# Patient Record
(demographics unavailable — no encounter records)

---

## 2024-10-08 NOTE — HISTORY OF PRESENT ILLNESS
[de-identified] : Patient with h/o left SNHL and dead right ear- wears left amplification, chronic rhinitis, dizziness, left epistaxis s/p electrocauterization 10/2/24, and dysphagia presents today with Daughter stating that he had a severe nosebleed the day after cauterization and this morning. Daughter states that his cariologist is going to keep him off of his blood thinners for the next week. Daughter states that he has been using saline gel spray.

## 2024-10-08 NOTE — CONSULT LETTER
[Dear  ___] : Dear  [unfilled], [Courtesy Letter:] : I had the pleasure of seeing your patient, [unfilled], in my office today. [Please see my note below.] : Please see my note below. [Consult Closing:] : Thank you very much for allowing me to participate in the care of this patient.  If you have any questions, please do not hesitate to contact me. [Sincerely,] : Sincerely, [FreeTextEntry3] :  Sivakumar Russell MD FACS

## 2024-10-08 NOTE — ADDENDUM
[FreeTextEntry1] :  Documented by Randy Leong acting as scribe for Dr. Russell on 10/08/2024. All Medical record entries made by the Scribe were at my, Dr. Russell, direction and personally dictated by me on 10/08/2024 . I have reviewed the chart and agree that the record accurately reflects my personal performance of the history, physical exam, assessment and plan. I have also personally directed, reviewed, and agreed with the discharge instructions.

## 2024-10-08 NOTE — ASSESSMENT
[FreeTextEntry1] : Reviewed and reconciled medications, allergies, PMHx, PSHx, SocHx, FMHx.  Patient with h/o left SNHL and dead right ear- wears left amplification, chronic rhinitis, dizziness, left epistaxis s/p electrocauterization 10/2/24, and dysphagia presents today with Daughter stating that he had a severe nosebleed the day after cauterization and this morning. Daughter states that his cariologist is going to keep him off of his blood thinners for the next week. Daughter states that he has been using saline gel spray.   Physical exam: -septal perforation -scab in the left nasal cavity removed via suction, but no active bleeding  ENT Procedure: Left Nasal Cauterization with Silver Nitrate  Plan: -Continue saline gel spray 3-4x per day starting tonight -Don't blow the nose or smear, only dab and throw tissue away. Sneeze with the mouth open -Use cotton ball soaked in Afrin as needed for nosebleeds -FU as needed

## 2024-10-08 NOTE — PHYSICAL EXAM
[Normal] : inferior turbinates and middle turbinates are normal [de-identified] : septal perforation. scab in the left nasal cavity removed via suction, but no active bleeding

## 2024-10-08 NOTE — PROCEDURE
[FreeTextEntry1] : Left Nasal Cauterization with Silver Nitrate [FreeTextEntry2] : left-sided epistaxis [FreeTextEntry3] :  Procedure: Left Nasal Cauterization with Silver Nitrate. After topical 4% xylocaine and oxymetazoline, the nasal vault was re-evaluated. Bleeding site identified. Cauterized with silver nitrate. Post-procedure instructions given. Patient tolerated procedure well

## 2024-10-09 NOTE — PROCEDURE
[Recalcitrant Symptoms] : recalcitrant symptoms  [Epistaxis] : evaluation of epistaxis [FreeTextEntry1] : Complex Control of Epistaxis with Left Electro Nasal Cauterization [FreeTextEntry2] : recurrent epistaxis [FreeTextEntry3] :  Procedure: Complex Control of Epistaxis with Left Electro Nasal Cauterization. 8 cc of 1% xyclocaine 1:806280 epi was injected into the left side of the nose. Cauterized with the electro-cautery. Post-procedure instructions given. Pt tolerated the procedure well. [FreeTextEntry6] :  Procedure: Rigid Nasal Endoscopy: Risks, benefits, and alternatives of rigid endoscopy were explained to the patient. The patient gave oral consent to proceed. The rigid scope was inserted into the right nasal cavity. Endoscopy of the inferior and middle meatus was performed. No polyp, mass, or lesion was appreciated. Olfactory cleft was clear. Spheno-ethmoid recess clear. Nasopharynx was with blood. Turbinates were without mass. There was blood from septal perforation on the right and blood trapped on the left inferior turbinate. The procedure was repeated on the contralateral side with similar findings. -blood from septal perforation on the right -blood in the nasopharynx -blood trapped on the left inferior turbinate

## 2024-10-09 NOTE — PHYSICAL EXAM
[Normal] : mucosa is normal [Midline] : trachea located in midline position [de-identified] : blood intranasally. septal perforation. scarring in the roof of the right nasal cavity - no active bleeding [de-identified] : no fresh blood intraorally

## 2024-10-09 NOTE — ADDENDUM
[FreeTextEntry1] :  Documented by Randy Leong acting as scribe for Dr. Russell on 10/09/2024. All Medical record entries made by the Scribe were at my, Dr. Russell, direction and personally dictated by me on 10/09/2024 . I have reviewed the chart and agree that the record accurately reflects my personal performance of the history, physical exam, assessment and plan. I have also personally directed, reviewed, and agreed with the discharge instructions.

## 2024-10-09 NOTE — HISTORY OF PRESENT ILLNESS
[de-identified] : Patient with h/o left SNHL and dead right ear- wears left amplification, chronic rhinitis, dizziness, left epistaxis s/p electrocauterization 10/2/24 and cauterization with silver nitrate 10/8/24, and dysphagia presents today with Daughter stating that he had a nosebleed between 3:30 and 5:30 this morning. Daughter states that his wife tried stopping his nosebleed with Afrin and pressure, but it did not work. Daughter states that he is still not taking blood thinners. Daughter states that he has been spitting up blood. Daughter states that he experienced this problem of recurrent nosebleeds a few years ago.

## 2024-10-09 NOTE — ASSESSMENT
[FreeTextEntry1] : Reviewed and reconciled medications, allergies, PMHx, PSHx, SocHx, FMHx.  Patient with h/o left SNHL and dead right ear- wears left amplification, chronic rhinitis, dizziness, left epistaxis s/p electrocauterization 10/2/24 and cauterization with silver nitrate 10/8/24, and dysphagia presents today with Daughter stating that he had a nosebleed between 3:30 and 5:30 this morning. Daughter states that his wife tried stopping his nosebleed with Afrin and pressure, but it did not work. Daughter states that he is still not taking blood thinners. Daughter states that he has been spitting up blood. Daughter states that he experienced this problem of recurrent nosebleeds a few years ago.   Physical exam: -blood intranasally -septal perforation -scarring in the roof of the right nasal cavity - no active bleeding -no fresh blood intraorally  ENT Procedure: Rigid nasal endoscopy and Complex Control of Epistaxis with Left Electro Nasal Cauterization -blood from septal perforation on the right -blood in the nasopharynx -blood trapped on the left inferior turbinate  Plan: -Start saline gel spray -Discussed r/b/a of hemostatic septal button or two-piece septal button (possible complications due to small perforation discussed) -FU in 1 week

## 2024-10-18 NOTE — HISTORY OF PRESENT ILLNESS
Rx Refill Note    Requested Prescriptions     Pending Prescriptions Disp Refills   • citalopram (CeleXA) 40 MG tablet [Pharmacy Med Name: CITALOPRAM HBR 40 MG TABLET] 30 tablet 0     Sig: TAKE ONE TABLET BY MOUTH DAILY        Last office visit with prescribing clinician: 3/28/2023      Next office visit with prescribing clinician: Visit date not found   Last labs:   Last refill: 02/28/2023   Pharmacy (be sure to add in Epic). correct               [de-identified] : Patient with h/o left SNHL and dead right ear- wears left amplification, chronic rhinitis, dizziness, left epistaxis s/p electrocauterization 10/2/24 and 10/9/24 and cauterization with silver nitrate 10/8/24, and dysphagia presents today with Daughter for a follow up. Daughter denies further epistaxis. Daughter states that he has been on Xarelto for the past 3 days. Daughter states that she has been using regular saline nasal spray followed by saline gel spray about every hour. Daughter states that he has seen improvements from swallow therapy.

## 2024-10-18 NOTE — ASSESSMENT
[FreeTextEntry1] : Reviewed and reconciled medications, allergies, PMHx, PSHx, SocHx, FMHx.  Patient with h/o left SNHL and dead right ear- wears left amplification, chronic rhinitis, dizziness, left epistaxis s/p electrocauterization 10/2/24 and 10/9/24 and cauterization with silver nitrate 10/8/24, and dysphagia presents today with Daughter for a follow up. Daughter denies further epistaxis. Daughter states that he has been on Xarelto for the past 3 days. Daughter states that she has been using regular saline nasal spray followed by saline gel spray about every hour. Daughter states that he has seen improvements from swallow therapy.   Physical exam: -right ear canal: cerumen removed via curettage -left ear canal: cerumen removed via curettage -dry crusting and scabbing intranasally L>R removed via tweezers   Plan: -Continue saline gel spray -FU in 1-2 weeks

## 2024-10-18 NOTE — PHYSICAL EXAM
[Normal] : no abnormal secretions [Hearing Loss Right Only] : normal [Hearing Loss Left Only] : normal [FreeTextEntry8] :  cerumen removed via curettage [FreeTextEntry9] :  cerumen removed via curettage [de-identified] : dry crusting and scabbing intranasally L>R removed via tweezers

## 2024-10-18 NOTE — ADDENDUM
[FreeTextEntry1] :  Documented by Randy Leong acting as scribe for Dr. Russell on 10/18/2024. All Medical record entries made by the Scribe were at my, Dr. Russell, direction and personally dictated by me on 10/18/2024 . I have reviewed the chart and agree that the record accurately reflects my personal performance of the history, physical exam, assessment and plan. I have also personally directed, reviewed, and agreed with the discharge instructions.

## 2024-10-29 NOTE — ASSESSMENT
[FreeTextEntry1] : Reviewed and reconciled medications, allergies, PMHx, PSHx, SocHx, FMHx.  Patient with h/o left SNHL and dead right ear- wears left amplification, chronic rhinitis, dizziness, left epistaxis s/p electrocauterization 10/2/24 and 10/9/24 and cauterization with silver nitrate 10/8/24, and dysphagia presents today with Daughter for a follow up. Daughter denies further epistaxis. Daughter states that he had an oral abscess, which he is currently taking antibiotics for.   Physical exam: -right ear canal: cerumen removed via curettage -left ear canal: cerumen removed via curettage -improved scabbing intranasally - no active bleeding -septal perforation  ENT Procedure: Left Electro Nasal Cauterization   Plan: -Start saline gel spray 3-4x per day starting tonight -Don't blow the nose or smear, only dab and throw tissue away. Sneeze with the mouth open -FU in 2 weeks

## 2024-10-29 NOTE — ADDENDUM
[FreeTextEntry1] :  Documented by Randy Leong acting as scribe for Dr. Russell on 10/29/2024. All Medical record entries made by the Scribe were at my, Dr. Russell, direction and personally dictated by me on 10/29/2024 . I have reviewed the chart and agree that the record accurately reflects my personal performance of the history, physical exam, assessment and plan. I have also personally directed, reviewed, and agreed with the discharge instructions.

## 2024-10-29 NOTE — PHYSICAL EXAM
[Normal] : mucosa is normal [Midline] : trachea located in midline position [Hearing Loss Right Only] : normal [Hearing Loss Left Only] : normal [FreeTextEntry8] :  cerumen removed via curettage [de-identified] : improved scabbing intranasally - no active bleeding. septal perforation

## 2024-10-29 NOTE — PROCEDURE
[FreeTextEntry1] : Left Electro Nasal Cauterization [FreeTextEntry2] : epistaxis [FreeTextEntry3] :  Procedure: Left Electro Nasal Cauterization. After topical 4% xylocaine and oxymetazoline, the nasal vault was re-evaluated. Bleeding site identified. 4 cc of 1% xyclocaine 1:223406 epi was injected into the left side of the nose. Cauterized with the electro-cautery. Mupirocin ointment added to the left nasal cavity. Post-procedure instructions given. Pt tolerated the procedure well.

## 2024-10-29 NOTE — HISTORY OF PRESENT ILLNESS
[de-identified] : Patient with h/o left SNHL and dead right ear- wears left amplification, chronic rhinitis, dizziness, left epistaxis s/p electrocauterization 10/2/24 and 10/9/24 and cauterization with silver nitrate 10/8/24, and dysphagia presents today with Daughter for a follow up. Daughter denies further epistaxis. Daughter states that he had an oral abscess, which he is currently taking antibiotics for. Patient has returned to the office with his daughter after having a severe left-sided nosebleed. Daughter states that he had a lot of blood coming from his left nose and mouth.

## 2025-01-07 NOTE — PHYSICAL EXAM
[General Appearance - Alert] : alert [General Appearance - In No Acute Distress] : in no acute distress [Ankle Swelling Bilaterally] : bilaterally  [Varicose Veins Of Lower Extremities] : bilaterally [Ankle Swelling On The Left] : moderate [1+] : left foot dorsalis pedis 1+ [No Joint Swelling] : no joint swelling [] : normal strength/tone [Normal Foot/Ankle] : Both lower extremities were exposed and visualized. Standing exam demonstrates normal foot posture and alignment. Hindfoot exam shows no hindfoot valgus or varus [FreeTextEntry1] : Skin rash bilateral feet and ankles also nail fungus. [Sensation] : the sensory exam was normal to light touch and pinprick [No Focal Deficits] : no focal deficits [Deep Tendon Reflexes (DTR)] : deep tendon reflexes were 2+ and symmetric [Motor Exam] : the motor exam was normal [Oriented To Time, Place, And Person] : oriented to person, place, and time [Impaired Insight] : insight and judgment were intact [Affect] : the affect was normal

## 2025-01-07 NOTE — ASSESSMENT
[FreeTextEntry1] : Discussed skin lesions with patient recommend topical cream pictures taken skin lesions do not appear to be concerning no need for biopsy at this time patient with varicosities and some discoloration but no breaks in the skin.  Patient also with fungal toenails discussed oral medications versus topicals patient a poor candidate for oral meds we will recommend topical treatment of nail fungus.

## 2025-01-07 NOTE — REASON FOR VISIT
[Follow-Up Visit] : a follow-up visit for [Onychomycosis] : onychomycosis [FreeTextEntry2] : skin lesions and nail fungus

## 2025-04-07 NOTE — HISTORY OF PRESENT ILLNESS
[de-identified] : Patient with h/o left SNHL and dead right ear- wears left amplification, chronic rhinitis, dizziness, left epistaxis s/p electrocauterization 10/2/24 and 10/9/24 and cauterization with silver nitrate 10/8/24, and dysphagia presents today for 6 month follow up. Patient daughter notes no further nose bleeds and has been using saline gel spray.

## 2025-04-07 NOTE — CONSULT LETTER
[Dear  ___] : Dear  [unfilled], [Courtesy Letter:] : I had the pleasure of seeing your patient, [unfilled], in my office today. [Please see my note below.] : Please see my note below. [Referral Closing:] : Thank you very much for seeing this patient.  If you have any questions, please do not hesitate to contact me. [Sincerely,] : Sincerely, [FreeTextEntry3] : Tyler Dickson PA-C

## 2025-04-07 NOTE — PHYSICAL EXAM
[Hearing Loss Right Only] : normal [Hearing Loss Left Only] : normal [FreeTextEntry8] : cerumen removed via curettage [FreeTextEntry9] : cerumen removed via curettage [FreeTextEntry1] : improved intranasally - no active bleeding. septal perforation. [Midline] : trachea located in midline position [Normal] : salivary glands are normal

## 2025-04-07 NOTE — ASSESSMENT
[FreeTextEntry1] : Reviewed and reconciled medications, allergies, PMHx, PSHx, SocHx, FMHx.      physical exam: right ear: cerumen removed via curettage left ear: cerumen removed via curettage Inflamed turbinates -improved  intranasally - no active bleeding -septal perforation    audio 4/7/2025:  type a au AD: Profound SNHl 250-8000hz AS: Hearing -1000hz, mild to mod-severe snhl 2000-8000hz Hearing stable Re Audio 1/30/2024   Plan: Continue saline gel spray Follow up 6 months      Case discussed with Dr. Russell

## 2025-04-09 NOTE — PHYSICAL EXAM
[General Appearance - Alert] : alert [General Appearance - In No Acute Distress] : in no acute distress [Ankle Swelling Bilaterally] : bilaterally  [Varicose Veins Of Lower Extremities] : bilaterally [Ankle Swelling On The Left] : moderate [1+] : left foot dorsalis pedis 1+ [No Joint Swelling] : no joint swelling [] : normal strength/tone [Normal Foot/Ankle] : Both lower extremities were exposed and visualized. Standing exam demonstrates normal foot posture and alignment. Hindfoot exam shows no hindfoot valgus or varus [FreeTextEntry1] : Skin rash bilateral feet and ankles also nail fungus.  Thin shiny skin noted. [Sensation] : the sensory exam was normal to light touch and pinprick [No Focal Deficits] : no focal deficits [Deep Tendon Reflexes (DTR)] : deep tendon reflexes were 2+ and symmetric [Motor Exam] : the motor exam was normal [Oriented To Time, Place, And Person] : oriented to person, place, and time [Impaired Insight] : insight and judgment were intact [Affect] : the affect was normal

## 2025-04-09 NOTE — ASSESSMENT
[FreeTextEntry1] : Discussed skin lesions with patient recommend topical cream pictures taken skin lesions do not appear to be concerning no need for biopsy at this time patient with varicosities and some discoloration but no breaks in the skin.  Patient also with fungal toenails discussed oral medications versus topicals patient a poor candidate for oral meds we will recommend topical treatment of nail fungus.  Today we did a nail debridement using double-action nail clippers along with an electrical bur.  Patient felt better after the nail debridement.

## 2025-07-17 NOTE — REASON FOR VISIT
[Follow-Up Visit] : a follow-up visit for [Onychomycosis] : onychomycosis [FreeTextEntry2] : f/u from 4/8/25 - [facility]

## 2025-07-17 NOTE — REASON FOR VISIT
[Follow-Up Visit] : a follow-up visit for [Onychomycosis] : onychomycosis [FreeTextEntry2] : f/u from 4/8/25 - [facility]

## 2025-07-20 NOTE — PHYSICAL EXAM
[General Appearance - Alert] : alert [General Appearance - In No Acute Distress] : in no acute distress [General Appearance - Well Nourished] : well nourished [Ankle Swelling Bilaterally] : bilaterally  [Varicose Veins Of Lower Extremities] : bilaterally [Ankle Swelling On The Left] : moderate [1+] : left foot dorsalis pedis 1+ [No Joint Swelling] : no joint swelling [] : normal strength/tone [Normal Foot/Ankle] : Both lower extremities were exposed and visualized. Standing exam demonstrates normal foot posture and alignment. Hindfoot exam shows no hindfoot valgus or varus [FreeTextEntry1] : Skin rash bilateral feet and ankles also nail fungus.  Thin shiny skin noted.  Yellow painful onychomycotic toenails [Sensation] : the sensory exam was normal to light touch and pinprick [No Focal Deficits] : no focal deficits [Deep Tendon Reflexes (DTR)] : deep tendon reflexes were 2+ and symmetric [Motor Exam] : the motor exam was normal [Oriented To Time, Place, And Person] : oriented to person, place, and time [Impaired Insight] : insight and judgment were intact [Affect] : the affect was normal

## 2025-07-20 NOTE — ASSESSMENT
[FreeTextEntry1] : Discussed skin lesions with patient recommend topical cream pictures taken skin lesions do not appear to be concerning no need for biopsy at this time patient with varicosities and some discoloration but no breaks in the skin.  Patient also with fungal toenails discussed oral medications versus topicals patient a poor candidate for oral meds we will recommend topical treatment of nail fungus.  Today we did a nail debridement using double-action nail clippers along with an electrical bur.  Patient felt better after the nail debridement. A double-action nail clipper, a curette, and an electrical file was used to debride all onychomycotic toenails. After debriding all nails alcohol was used to cleanse the area.  Patient understands the importance of not trying to cut their own toenails due to the risk of infection and the fact that the nails are thick yellow and painful.  After debridement the nails were much less painful clinically and we put patient's shoes on and they were able to walk with much less pain after debridement of all onychomycotic toenails.  Treatment options were reviewed including oral medication topical medication and laser therapy.  Patient understands part of the treatment is coming to the office every few months for debridement of thick painful onychomycotic toenails.